# Patient Record
Sex: FEMALE | Race: WHITE | NOT HISPANIC OR LATINO | Employment: FULL TIME
[De-identification: names, ages, dates, MRNs, and addresses within clinical notes are randomized per-mention and may not be internally consistent; named-entity substitution may affect disease eponyms.]

---

## 2022-01-25 LAB
HEPATITIS B SURFACE ANTIGEN (EXTERNAL): NEGATIVE
HIV1+2 AB SERPL QL IA: NEGATIVE
RUBELLA ANTIBODY IGG (EXTERNAL): NORMAL
TREPONEMA PALLIDUM ANTIBODY (EXTERNAL): NONREACTIVE

## 2022-07-07 ENCOUNTER — HOSPITAL ENCOUNTER (OUTPATIENT)
Age: 32
End: 2022-07-07
Attending: OBSTETRICS & GYNECOLOGY | Admitting: OBSTETRICS & GYNECOLOGY
Payer: COMMERCIAL

## 2022-07-07 LAB — GROUP B STREPTOCOCCUS (EXTERNAL): NEGATIVE

## 2022-08-04 ENCOUNTER — HOSPITAL ENCOUNTER (INPATIENT)
Facility: CLINIC | Age: 32
LOS: 2 days | Discharge: HOME | End: 2022-08-06
Attending: OBSTETRICS & GYNECOLOGY | Admitting: OBSTETRICS & GYNECOLOGY
Payer: COMMERCIAL

## 2022-08-04 ENCOUNTER — ANESTHESIA EVENT (OUTPATIENT)
Dept: OBGYN | Facility: CLINIC | Age: 32
End: 2022-08-04
Payer: COMMERCIAL

## 2022-08-04 ENCOUNTER — ANESTHESIA (OUTPATIENT)
Dept: OBGYN | Facility: CLINIC | Age: 32
End: 2022-08-04
Payer: COMMERCIAL

## 2022-08-04 ENCOUNTER — APPOINTMENT (OUTPATIENT)
Dept: ULTRASOUND IMAGING | Facility: CLINIC | Age: 32
End: 2022-08-04
Attending: OBSTETRICS & GYNECOLOGY
Payer: COMMERCIAL

## 2022-08-04 DIAGNOSIS — Z36.89 ENCOUNTER FOR TRIAGE IN PREGNANT PATIENT: ICD-10-CM

## 2022-08-04 LAB
ABO/RH(D): NORMAL
ANTIBODY SCREEN: NEGATIVE
ERYTHROCYTE [DISTWIDTH] IN BLOOD BY AUTOMATED COUNT: 12.3 % (ref 10–15)
HCT VFR BLD AUTO: 38.9 % (ref 35–47)
HGB BLD-MCNC: 13.1 G/DL (ref 11.7–15.7)
MCH RBC QN AUTO: 30.4 PG (ref 26.5–33)
MCHC RBC AUTO-ENTMCNC: 33.7 G/DL (ref 31.5–36.5)
MCV RBC AUTO: 90 FL (ref 78–100)
PLATELET # BLD AUTO: 183 10E3/UL (ref 150–450)
RBC # BLD AUTO: 4.31 10E6/UL (ref 3.8–5.2)
SARS-COV-2 RNA RESP QL NAA+PROBE: NEGATIVE
SPECIMEN EXPIRATION DATE: NORMAL
T PALLIDUM AB SER QL: NONREACTIVE
WBC # BLD AUTO: 16.2 10E3/UL (ref 4–11)

## 2022-08-04 PROCEDURE — 250N000009 HC RX 250: Performed by: ANESTHESIOLOGY

## 2022-08-04 PROCEDURE — 00HU33Z INSERTION OF INFUSION DEVICE INTO SPINAL CANAL, PERCUTANEOUS APPROACH: ICD-10-PCS | Performed by: ANESTHESIOLOGY

## 2022-08-04 PROCEDURE — 250N000009 HC RX 250: Performed by: OBSTETRICS & GYNECOLOGY

## 2022-08-04 PROCEDURE — 250N000013 HC RX MED GY IP 250 OP 250 PS 637: Performed by: OBSTETRICS & GYNECOLOGY

## 2022-08-04 PROCEDURE — 120N000001 HC R&B MED SURG/OB

## 2022-08-04 PROCEDURE — 10907ZC DRAINAGE OF AMNIOTIC FLUID, THERAPEUTIC FROM PRODUCTS OF CONCEPTION, VIA NATURAL OR ARTIFICIAL OPENING: ICD-10-PCS | Performed by: OBSTETRICS & GYNECOLOGY

## 2022-08-04 PROCEDURE — 86901 BLOOD TYPING SEROLOGIC RH(D): CPT | Performed by: OBSTETRICS & GYNECOLOGY

## 2022-08-04 PROCEDURE — 722N000001 HC LABOR CARE VAGINAL DELIVERY SINGLE

## 2022-08-04 PROCEDURE — 3E0R3BZ INTRODUCTION OF ANESTHETIC AGENT INTO SPINAL CANAL, PERCUTANEOUS APPROACH: ICD-10-PCS | Performed by: ANESTHESIOLOGY

## 2022-08-04 PROCEDURE — 76819 FETAL BIOPHYS PROFIL W/O NST: CPT

## 2022-08-04 PROCEDURE — U0005 INFEC AGEN DETEC AMPLI PROBE: HCPCS | Performed by: OBSTETRICS & GYNECOLOGY

## 2022-08-04 PROCEDURE — 250N000011 HC RX IP 250 OP 636

## 2022-08-04 PROCEDURE — 250N000011 HC RX IP 250 OP 636: Performed by: ANESTHESIOLOGY

## 2022-08-04 PROCEDURE — 85027 COMPLETE CBC AUTOMATED: CPT | Performed by: OBSTETRICS & GYNECOLOGY

## 2022-08-04 PROCEDURE — 86850 RBC ANTIBODY SCREEN: CPT | Performed by: OBSTETRICS & GYNECOLOGY

## 2022-08-04 PROCEDURE — G0463 HOSPITAL OUTPT CLINIC VISIT: HCPCS

## 2022-08-04 PROCEDURE — 370N000003 HC ANESTHESIA WARD SERVICE

## 2022-08-04 PROCEDURE — 86780 TREPONEMA PALLIDUM: CPT | Performed by: OBSTETRICS & GYNECOLOGY

## 2022-08-04 PROCEDURE — 258N000003 HC RX IP 258 OP 636: Performed by: OBSTETRICS & GYNECOLOGY

## 2022-08-04 PROCEDURE — 0HQ9XZZ REPAIR PERINEUM SKIN, EXTERNAL APPROACH: ICD-10-PCS | Performed by: OBSTETRICS & GYNECOLOGY

## 2022-08-04 RX ORDER — NALOXONE HYDROCHLORIDE 0.4 MG/ML
0.4 INJECTION, SOLUTION INTRAMUSCULAR; INTRAVENOUS; SUBCUTANEOUS
Status: DISCONTINUED | OUTPATIENT
Start: 2022-08-04 | End: 2022-08-04 | Stop reason: HOSPADM

## 2022-08-04 RX ORDER — FENTANYL CITRATE 50 UG/ML
100 INJECTION, SOLUTION INTRAMUSCULAR; INTRAVENOUS
Status: DISCONTINUED | OUTPATIENT
Start: 2022-08-04 | End: 2022-08-04 | Stop reason: HOSPADM

## 2022-08-04 RX ORDER — PROCHLORPERAZINE MALEATE 10 MG
10 TABLET ORAL EVERY 6 HOURS PRN
Status: DISCONTINUED | OUTPATIENT
Start: 2022-08-04 | End: 2022-08-04 | Stop reason: HOSPADM

## 2022-08-04 RX ORDER — BISACODYL 10 MG
10 SUPPOSITORY, RECTAL RECTAL DAILY PRN
Status: DISCONTINUED | OUTPATIENT
Start: 2022-08-04 | End: 2022-08-06 | Stop reason: HOSPADM

## 2022-08-04 RX ORDER — SODIUM CHLORIDE, SODIUM LACTATE, POTASSIUM CHLORIDE, CALCIUM CHLORIDE 600; 310; 30; 20 MG/100ML; MG/100ML; MG/100ML; MG/100ML
INJECTION, SOLUTION INTRAVENOUS CONTINUOUS PRN
Status: DISCONTINUED | OUTPATIENT
Start: 2022-08-04 | End: 2022-08-04 | Stop reason: HOSPADM

## 2022-08-04 RX ORDER — IBUPROFEN 800 MG/1
800 TABLET, FILM COATED ORAL
Status: DISCONTINUED | OUTPATIENT
Start: 2022-08-04 | End: 2022-08-04

## 2022-08-04 RX ORDER — OXYTOCIN 10 [USP'U]/ML
10 INJECTION, SOLUTION INTRAMUSCULAR; INTRAVENOUS
Status: DISCONTINUED | OUTPATIENT
Start: 2022-08-04 | End: 2022-08-04 | Stop reason: HOSPADM

## 2022-08-04 RX ORDER — CARBOPROST TROMETHAMINE 250 UG/ML
250 INJECTION, SOLUTION INTRAMUSCULAR
Status: DISCONTINUED | OUTPATIENT
Start: 2022-08-04 | End: 2022-08-06 | Stop reason: HOSPADM

## 2022-08-04 RX ORDER — MISOPROSTOL 200 UG/1
800 TABLET ORAL
Status: DISCONTINUED | OUTPATIENT
Start: 2022-08-04 | End: 2022-08-06 | Stop reason: HOSPADM

## 2022-08-04 RX ORDER — MISOPROSTOL 200 UG/1
800 TABLET ORAL
Status: DISCONTINUED | OUTPATIENT
Start: 2022-08-04 | End: 2022-08-04 | Stop reason: HOSPADM

## 2022-08-04 RX ORDER — MISOPROSTOL 200 UG/1
400 TABLET ORAL
Status: DISCONTINUED | OUTPATIENT
Start: 2022-08-04 | End: 2022-08-06 | Stop reason: HOSPADM

## 2022-08-04 RX ORDER — OXYTOCIN/0.9 % SODIUM CHLORIDE 30/500 ML
340 PLASTIC BAG, INJECTION (ML) INTRAVENOUS CONTINUOUS PRN
Status: DISCONTINUED | OUTPATIENT
Start: 2022-08-04 | End: 2022-08-06 | Stop reason: HOSPADM

## 2022-08-04 RX ORDER — KETOROLAC TROMETHAMINE 30 MG/ML
30 INJECTION, SOLUTION INTRAMUSCULAR; INTRAVENOUS
Status: DISCONTINUED | OUTPATIENT
Start: 2022-08-04 | End: 2022-08-04

## 2022-08-04 RX ORDER — METHYLERGONOVINE MALEATE 0.2 MG/ML
200 INJECTION INTRAVENOUS
Status: DISCONTINUED | OUTPATIENT
Start: 2022-08-04 | End: 2022-08-06 | Stop reason: HOSPADM

## 2022-08-04 RX ORDER — NALBUPHINE HYDROCHLORIDE 10 MG/ML
2.5-5 INJECTION, SOLUTION INTRAMUSCULAR; INTRAVENOUS; SUBCUTANEOUS EVERY 6 HOURS PRN
Status: DISCONTINUED | OUTPATIENT
Start: 2022-08-04 | End: 2022-08-04

## 2022-08-04 RX ORDER — PRENATAL VIT/IRON FUM/FOLIC AC 27MG-0.8MG
1 TABLET ORAL DAILY
COMMUNITY

## 2022-08-04 RX ORDER — MISOPROSTOL 200 UG/1
400 TABLET ORAL
Status: DISCONTINUED | OUTPATIENT
Start: 2022-08-04 | End: 2022-08-04 | Stop reason: HOSPADM

## 2022-08-04 RX ORDER — HYDROCORTISONE 25 MG/G
CREAM TOPICAL 3 TIMES DAILY PRN
Status: DISCONTINUED | OUTPATIENT
Start: 2022-08-04 | End: 2022-08-06 | Stop reason: HOSPADM

## 2022-08-04 RX ORDER — SODIUM CHLORIDE, SODIUM LACTATE, POTASSIUM CHLORIDE, CALCIUM CHLORIDE 600; 310; 30; 20 MG/100ML; MG/100ML; MG/100ML; MG/100ML
INJECTION, SOLUTION INTRAVENOUS CONTINUOUS
Status: DISCONTINUED | OUTPATIENT
Start: 2022-08-04 | End: 2022-08-04 | Stop reason: HOSPADM

## 2022-08-04 RX ORDER — OXYTOCIN 10 [USP'U]/ML
10 INJECTION, SOLUTION INTRAMUSCULAR; INTRAVENOUS
Status: DISCONTINUED | OUTPATIENT
Start: 2022-08-04 | End: 2022-08-06 | Stop reason: HOSPADM

## 2022-08-04 RX ORDER — METOCLOPRAMIDE 10 MG/1
10 TABLET ORAL EVERY 6 HOURS PRN
Status: DISCONTINUED | OUTPATIENT
Start: 2022-08-04 | End: 2022-08-04 | Stop reason: HOSPADM

## 2022-08-04 RX ORDER — OXYTOCIN 10 [USP'U]/ML
10 INJECTION, SOLUTION INTRAMUSCULAR; INTRAVENOUS
Status: DISCONTINUED | OUTPATIENT
Start: 2022-08-04 | End: 2022-08-04

## 2022-08-04 RX ORDER — MODIFIED LANOLIN
OINTMENT (GRAM) TOPICAL
Status: DISCONTINUED | OUTPATIENT
Start: 2022-08-04 | End: 2022-08-06 | Stop reason: HOSPADM

## 2022-08-04 RX ORDER — LIDOCAINE 40 MG/G
CREAM TOPICAL
Status: DISCONTINUED | OUTPATIENT
Start: 2022-08-04 | End: 2022-08-04

## 2022-08-04 RX ORDER — ASPIRIN 81 MG/1
81 TABLET, CHEWABLE ORAL DAILY
Status: ON HOLD | COMMUNITY
End: 2022-08-05

## 2022-08-04 RX ORDER — HYDROXYZINE HYDROCHLORIDE 50 MG/1
100 TABLET, FILM COATED ORAL ONCE
Status: DISCONTINUED | OUTPATIENT
Start: 2022-08-04 | End: 2022-08-04

## 2022-08-04 RX ORDER — LIDOCAINE HYDROCHLORIDE AND EPINEPHRINE 15; 5 MG/ML; UG/ML
INJECTION, SOLUTION EPIDURAL PRN
Status: DISCONTINUED | OUTPATIENT
Start: 2022-08-04 | End: 2022-08-04

## 2022-08-04 RX ORDER — OXYTOCIN/0.9 % SODIUM CHLORIDE 30/500 ML
340 PLASTIC BAG, INJECTION (ML) INTRAVENOUS CONTINUOUS PRN
Status: COMPLETED | OUTPATIENT
Start: 2022-08-04 | End: 2022-08-04

## 2022-08-04 RX ORDER — EPHEDRINE SULFATE 50 MG/ML
5 INJECTION, SOLUTION INTRAMUSCULAR; INTRAVENOUS; SUBCUTANEOUS
Status: DISCONTINUED | OUTPATIENT
Start: 2022-08-04 | End: 2022-08-04 | Stop reason: HOSPADM

## 2022-08-04 RX ORDER — METOCLOPRAMIDE HYDROCHLORIDE 5 MG/ML
10 INJECTION INTRAMUSCULAR; INTRAVENOUS EVERY 6 HOURS PRN
Status: DISCONTINUED | OUTPATIENT
Start: 2022-08-04 | End: 2022-08-04 | Stop reason: HOSPADM

## 2022-08-04 RX ORDER — OXYTOCIN/0.9 % SODIUM CHLORIDE 30/500 ML
100-340 PLASTIC BAG, INJECTION (ML) INTRAVENOUS CONTINUOUS PRN
Status: DISCONTINUED | OUTPATIENT
Start: 2022-08-04 | End: 2022-08-04

## 2022-08-04 RX ORDER — NALOXONE HYDROCHLORIDE 0.4 MG/ML
0.2 INJECTION, SOLUTION INTRAMUSCULAR; INTRAVENOUS; SUBCUTANEOUS
Status: DISCONTINUED | OUTPATIENT
Start: 2022-08-04 | End: 2022-08-04 | Stop reason: HOSPADM

## 2022-08-04 RX ORDER — ONDANSETRON 4 MG/1
4 TABLET, ORALLY DISINTEGRATING ORAL EVERY 6 HOURS PRN
Status: DISCONTINUED | OUTPATIENT
Start: 2022-08-04 | End: 2022-08-04 | Stop reason: HOSPADM

## 2022-08-04 RX ORDER — FAMOTIDINE 20 MG/1
20 TABLET, FILM COATED ORAL AT BEDTIME
Status: DISCONTINUED | OUTPATIENT
Start: 2022-08-04 | End: 2022-08-04

## 2022-08-04 RX ORDER — OXYTOCIN/0.9 % SODIUM CHLORIDE 30/500 ML
1-24 PLASTIC BAG, INJECTION (ML) INTRAVENOUS CONTINUOUS
Status: DISCONTINUED | OUTPATIENT
Start: 2022-08-04 | End: 2022-08-04 | Stop reason: HOSPADM

## 2022-08-04 RX ORDER — BUPIVACAINE HYDROCHLORIDE 2.5 MG/ML
15 INJECTION, SOLUTION EPIDURAL; INFILTRATION; INTRACAUDAL ONCE
Status: COMPLETED | OUTPATIENT
Start: 2022-08-04 | End: 2022-08-04

## 2022-08-04 RX ORDER — PROCHLORPERAZINE 25 MG
25 SUPPOSITORY, RECTAL RECTAL EVERY 12 HOURS PRN
Status: DISCONTINUED | OUTPATIENT
Start: 2022-08-04 | End: 2022-08-04 | Stop reason: HOSPADM

## 2022-08-04 RX ORDER — TRANEXAMIC ACID 10 MG/ML
1 INJECTION, SOLUTION INTRAVENOUS EVERY 30 MIN PRN
Status: DISCONTINUED | OUTPATIENT
Start: 2022-08-04 | End: 2022-08-04 | Stop reason: HOSPADM

## 2022-08-04 RX ORDER — CITRIC ACID/SODIUM CITRATE 334-500MG
30 SOLUTION, ORAL ORAL
Status: DISCONTINUED | OUTPATIENT
Start: 2022-08-04 | End: 2022-08-04 | Stop reason: HOSPADM

## 2022-08-04 RX ORDER — LIDOCAINE 40 MG/G
CREAM TOPICAL
Status: DISCONTINUED | OUTPATIENT
Start: 2022-08-04 | End: 2022-08-04 | Stop reason: HOSPADM

## 2022-08-04 RX ORDER — TRANEXAMIC ACID 10 MG/ML
1 INJECTION, SOLUTION INTRAVENOUS EVERY 30 MIN PRN
Status: DISCONTINUED | OUTPATIENT
Start: 2022-08-04 | End: 2022-08-06 | Stop reason: HOSPADM

## 2022-08-04 RX ORDER — CARBOPROST TROMETHAMINE 250 UG/ML
250 INJECTION, SOLUTION INTRAMUSCULAR
Status: DISCONTINUED | OUTPATIENT
Start: 2022-08-04 | End: 2022-08-04 | Stop reason: HOSPADM

## 2022-08-04 RX ORDER — FENTANYL/BUPIVACAINE/NS/PF 2-1250MCG
PLASTIC BAG, INJECTION (ML) INJECTION
Status: COMPLETED
Start: 2022-08-04 | End: 2022-08-04

## 2022-08-04 RX ORDER — ONDANSETRON 2 MG/ML
4 INJECTION INTRAMUSCULAR; INTRAVENOUS EVERY 6 HOURS PRN
Status: DISCONTINUED | OUTPATIENT
Start: 2022-08-04 | End: 2022-08-04 | Stop reason: HOSPADM

## 2022-08-04 RX ORDER — DOCUSATE SODIUM 100 MG/1
100 CAPSULE, LIQUID FILLED ORAL DAILY
Status: DISCONTINUED | OUTPATIENT
Start: 2022-08-04 | End: 2022-08-06 | Stop reason: HOSPADM

## 2022-08-04 RX ORDER — ACETAMINOPHEN 325 MG/1
650 TABLET ORAL EVERY 4 HOURS PRN
Status: DISCONTINUED | OUTPATIENT
Start: 2022-08-04 | End: 2022-08-06 | Stop reason: HOSPADM

## 2022-08-04 RX ORDER — METHYLERGONOVINE MALEATE 0.2 MG/ML
200 INJECTION INTRAVENOUS
Status: DISCONTINUED | OUTPATIENT
Start: 2022-08-04 | End: 2022-08-04 | Stop reason: HOSPADM

## 2022-08-04 RX ORDER — IBUPROFEN 800 MG/1
800 TABLET, FILM COATED ORAL EVERY 6 HOURS PRN
Status: DISCONTINUED | OUTPATIENT
Start: 2022-08-04 | End: 2022-08-06 | Stop reason: HOSPADM

## 2022-08-04 RX ORDER — PRENATAL VIT/IRON FUM/FOLIC AC 27MG-0.8MG
1 TABLET ORAL DAILY
Status: DISCONTINUED | OUTPATIENT
Start: 2022-08-04 | End: 2022-08-06 | Stop reason: HOSPADM

## 2022-08-04 RX ADMIN — EPHEDRINE SULFATE 5 MG: 50 INJECTION INTRAVENOUS at 10:37

## 2022-08-04 RX ADMIN — IBUPROFEN 800 MG: 800 TABLET, FILM COATED ORAL at 21:01

## 2022-08-04 RX ADMIN — Medication 340 ML/HR: at 13:04

## 2022-08-04 RX ADMIN — PRENATAL VITAMINS-IRON FUMARATE 27 MG IRON-FOLIC ACID 0.8 MG TABLET 1 TABLET: at 16:14

## 2022-08-04 RX ADMIN — IBUPROFEN 800 MG: 800 TABLET, FILM COATED ORAL at 14:21

## 2022-08-04 RX ADMIN — BUPIVACAINE HYDROCHLORIDE 5 ML: 2.5 INJECTION, SOLUTION EPIDURAL; INFILTRATION; INTRACAUDAL at 08:23

## 2022-08-04 RX ADMIN — BUPIVACAINE HYDROCHLORIDE 5 ML: 2.5 INJECTION, SOLUTION EPIDURAL; INFILTRATION; INTRACAUDAL at 08:25

## 2022-08-04 RX ADMIN — LIDOCAINE HYDROCHLORIDE,EPINEPHRINE BITARTRATE 3 ML: 15; .005 INJECTION, SOLUTION EPIDURAL; INFILTRATION; INTRACAUDAL; PERINEURAL at 08:21

## 2022-08-04 RX ADMIN — Medication 2 MILLI-UNITS/MIN: at 10:45

## 2022-08-04 RX ADMIN — Medication: at 08:34

## 2022-08-04 RX ADMIN — SODIUM CHLORIDE, POTASSIUM CHLORIDE, SODIUM LACTATE AND CALCIUM CHLORIDE 500 ML: 600; 310; 30; 20 INJECTION, SOLUTION INTRAVENOUS at 11:46

## 2022-08-04 RX ADMIN — DOCUSATE SODIUM 100 MG: 100 CAPSULE, LIQUID FILLED ORAL at 16:14

## 2022-08-04 ASSESSMENT — ACTIVITIES OF DAILY LIVING (ADL)
ADLS_ACUITY_SCORE: 20

## 2022-08-04 NOTE — PROGRESS NOTES
"[facility]   OB PROGRESS NOTE     S. Pt states she is doing well overall. Comfortable with epidural . +FM    BP 98/56   Pulse 57   Temp 98.1  F (36.7  C) (Oral)   Resp 18   Ht 1.772 m (5' 9.75\")   Wt 93 kg (205 lb)   BMI 29.63 kg/m      Fetal Heart Tones: 125 baseline, moderate variablility, + accels, no decels and Category I  TOCO: frequency q 4-8 minutes  Cervical Exam: 4.5/ 90/ Anterior/ soft/ -1    A/P Mariya Santiago is a 31 year old  at 40w5d here with labor    1- labor  - AROM with scant fluid   - will start pitocin   - FHT Cat I  - continue monitoring    Dr. Heather Aguilera DO       "

## 2022-08-04 NOTE — PROVIDER NOTIFICATION
08/04/22 0550   Provider Notification   Provider Name/Title Dr. Brower   Method of Notification Electronic Page   Request Evaluate - Remote   Notification Reason Status Update     Notified Dr. Brower of late deceleration on FHR tracing and increase in pt's contraction pain after he had given the order for pt to discharge to home given unchanged SVE.  MD reviewed tracing. Orders to admit patient, intrapartum orders received and placed.  Report given to oncoming RN who assumed care of patient.

## 2022-08-04 NOTE — PLAN OF CARE
Dr Aguilera updated on SVE 3.5/90/-1 station, ctx pattern, and patients tolerance to labor pain. Pt is rating her pain at 4-5 out of 10, is coping with labor but, at some point would like an epidural.

## 2022-08-04 NOTE — H&P
[facility]     History and Physical  Obstetrics and Gynecology     Date of Admission:  2022    History of Present Illness   Mariya Santiago is a 31 year old female  40w5d with Estimated Date of Delivery: 2022 who presents with early labor. Ctx have been increasing past 24 hours. Denies LOF, VB, decreased FM.    PRENATAL COURSE  Prenatal care was received at [facility] and the  course was complicated by COVID 1st TriM, Constipation, Overweight, Round Ligament Pain, GERD.     No lab results found.  Rhogam not indicated   Recent Labs   Lab Test 22  0425   GBS Negative       Past Medical History    I have reviewed this patient's medical history and updated it with pertinent information if needed.   Past Medical History:   Diagnosis Date     Kidney infection        Past Surgical History   I have reviewed this patient's surgical history and updated it with pertinent information if needed.  Past Surgical History:   Procedure Laterality Date     WISDOM TOOTH EXTRACTION         Prior to Admission Medications   Prior to Admission Medications   Prescriptions Last Dose Informant Patient Reported? Taking?   Prenatal Vit-Fe Fumarate-FA (PRENATAL MULTIVITAMIN W/IRON) 27-0.8 MG tablet 22  Yes Yes   Sig: Take 1 tablet by mouth daily   aspirin (ASA) 81 MG chewable tablet 22 at Unknown time  Yes Yes   Sig: Take 81 mg by mouth daily      Facility-Administered Medications: None     Allergies   Allergies   Allergen Reactions     Diazepam Hives     Sulfamethoxazole-Trimethoprim Hives and Rash       Social History   I have reviewed this patient's social history and updated it with pertinent information if needed. Mariya Santiago  reports that she has never smoked. She has never used smokeless tobacco. She reports previous alcohol use. She reports that she does not use drugs.    Family History   I have reviewed this patient's family  history and updated it with pertinent information if needed.   History reviewed. No pertinent family history.    Immunization History   Reviewed    Physical Exam   Temp: 98.8  F (37.1  C) Temp src: Oral BP: 127/71     Resp: 16        Vital Signs with Ranges  Temp:  [98.8  F (37.1  C)] 98.8  F (37.1  C)  Resp:  [16] 16  BP: (127)/(71) 127/71  Fetal Heart Tones: 130 baseline, moderate variablility, + accels, + decels (occassional late decelerations w/o significant bradycardia); Category II reactive and reassuring.  TOCO: frequency q 4 minutes    Constitutional: healthy, alert and active   Respiratory: Non-labored  Cardiovascular: RR  Abdomen: gravid, single vertex fetus, non-tender, EFW 8 lbs  Cervical Exam: 3/ 80/ Anterior/ firm/ -2   Skin/Extremites: no redness, warmth, or swelling  Neurologic: A&O  Neuropsychiatric: General: normal, calm and normal eye contact    Prenatal Care:  -OB labs reviewed: A+, Ab screen neg, HepB Surface Ag neg, Hep C Ab neg, HIV neg, Rubella immune, Trep NR  -Genetics: NIPS low risk, XX, AFP neg  -Anatomy ultrasound: LVL 2 MFM US, normal growth  -GCT (103), CBC (11.9), RPR (NR)  -S/p Tdap 22   -S/p Flu vaccine   -COVID vaccine Carmenza 3/2021; Moderna   -GBS at 36 weeks: neg.  -Feeding: breastfeeding, pump Rx sent, picked up.  -BC: Jg    22 BPP: cephalic, , placenta fundal    Assessment & Plan   Mariya Santiago is a 31 year old female  who presents at 40w5d with early labor, BPP 10/10 but Cat II FHT's with decels noted. Admit for augmentation.   GBS neg.   NST reactive.  Category  II, though reactive and reassuring     Admit - see IP orders  - Pitocin  - s/p Epidural    - CBC, T&S, Syph, COVID  - Home Pepcid 20mg at bedtime  - Cancel Saturday IOL    Heather Aguilera DO

## 2022-08-04 NOTE — PROVIDER NOTIFICATION
08/04/22 0340   Provider Notification   Provider Name/Title Dr. Brower   Method of Notification Electronic Page   Request Evaluate - Remote   Notification Reason SVE     Updated MD regarding SVE recheck of 3/80/-2, which is essentially unchanged from first exam.  FHT difficult to determine if baby is having accels or decels.  [address] [place] and US adjusted with moms position changes to help determine FHT with contractions. Orders received for BPP, recheck SVE when back from BPP if  patient is still having contractions. Update MD at that time.

## 2022-08-04 NOTE — PROVIDER NOTIFICATION
22 1300   Comments   Comments  of female infant. Baby placed skin to skin, delayed cord clamping completed. APGAR 9&9

## 2022-08-04 NOTE — PLAN OF CARE
Data: Patient presented to [facility]: 2022  1:37 AM.  Reason for maternal/fetal assessment is uterine contractions. Patient reports she had her membranes swept on Tuesday during her clinic appt and has been having contractions since then. Initially, pt reports them being very irregular, but then coming every 10 minutes and getting closer together until she decided to come in when they were every 4-6 minutes apart.  Pt rates the contraction pain 4/10 and describes it as cramping in her lower abdomen with constant back pain.  Patient is a .  Prenatal record reviewed. Pregnancy has been uncomplicated..  Gestational Age 40w5d. VSS. Fetal movement active. Patient denies leaking of vaginal fluid/rupture of membranes, vaginal bleeding, nausea, vomiting, headache, visual disturbances, epigastric or URQ pain, significant edema. Support person is present.   Action: Verbal consent for EFM. Triage assessment completed. Bill of rights reviewed.  Response: Patient verbalized agreement with plan. Will contact Dr Lui Brower with update and for further orders.

## 2022-08-04 NOTE — PROVIDER NOTIFICATION
08/04/22 1145   Comments   Comments Recurrent variable and intermittent PD. repositioned on left side. SVE 6-7/100/0 station. Pitocin discontinued and LR bolus started. Repositioned on left side. Dr Aguilera updated.

## 2022-08-04 NOTE — ANESTHESIA PROCEDURE NOTES
Epidural catheter Procedure Note    Pre-Procedure   Staff -        Anesthesiologist:  Jonel Herrera MD       Performed By: anesthesiologist       Referred By: Hazel       Location: OB       Pre-Anesthestic Checklist: patient identified, IV checked, risks and benefits discussed, informed consent, monitors and equipment checked, pre-op evaluation, at physician/surgeon's request and post-op pain management  Timeout:       Correct Patient: Yes        Correct Procedure: Yes        Correct Site: Yes        Correct Position: Yes   Procedure Documentation  Procedure: epidural catheter   Comments:  Patient desires Labor Epidural for labor analgesia. Vaginal delivery anticipated.    Chart reviewed. Patient examined. No changes to pre procedure chart review. Risks including but not limited to bleeding, infection, nerve injury, PDPH, intrathecal injection, high block, incomplete block, one-sided block, back pain, and low blood pressure discussed in detail. Questions answered. Consent signed.    Pause for the Cause completed. NIBP and pulse ox functioning. L&D nurse present.    Procedure: Sitting. Betadine prep x 3. Sterile drape applied.  Lidocaine 1% x 2 cc local infiltration at L 3-4.  17 G. Tu needle ML ALISIA 1 attempt.  No CSF, paresthesia or blood. 20 g. Epidural catheter inserted w/o resistance 5 cm.  Negative aspiration for CSF and blood. Filter in line.  Test dose Lidocaine 1.5% w/ 1:200,000 epi x 3 cc injected. Negative for neuro change or symptoms of intravascular injection.  Bolus dose: Marcaine 0.25% 5cc x 2 doses (10 cc total).  Infusion orders written.    I or my partner am immediately available. I or my partner will monitor the patient and supervise nursing care at necessary intervals.    JAKollitzMD

## 2022-08-04 NOTE — L&D DELIVERY NOTE
Mariya Santiago is a 31 year old-year-old  female,  1 para 0 with EDC 22, who was admitted for labor at 40 weeks gestation.    Her prenatal care was at the [facility] in [place]. Prenatal course was complicated by COVID 1st TriM, Constipation, Overweight, Round Ligament Pain, GERD. Vaginal Group B Streptococcus culture was negative.  Patient underwent artificial rupture of membranes at 1025, yielding scant clear fluid.     Oxytocin augmentation was initiated per standard protocol for absent/inadequate labor.  Patient received an epidural for pain relief.  The patient achieved complete dilation at 1230. She went on to deliver a 7 #, 11.5 oz female infant at 1300 by . Apgars were  9 at one minute and 9 at five minutes. The fetal oropharynx was bulb suctioned on the perineum.  There was no nuchal cord. The placenta delivered spontaneously and intact at 1304.  The patient had bilateral labial lacerations. This was repaired with #4-0 vicryl.  EBL for the delivery was 27.    Dr. Heather Aguilera DO

## 2022-08-04 NOTE — PROVIDER NOTIFICATION
08/04/22 1245   Comments   Comments EFM tracing reviewed. SVE 10/100/+2 will begin pushing with ctx

## 2022-08-04 NOTE — PROVIDER NOTIFICATION
08/04/22 0510   Provider Notification   Provider Name/Title Dr. Brower   Method of Notification Electronic Page   Request Evaluate - Remote   Notification Reason SVE;Status Update       Updated MD of BPP results of 8/8. Reactive NST since pt has been on continuous monitoring when back from US.  SVE 3/80/-2, unchanged from previous exam.  Orders for patient to discharge home. Okay to give patient hydroxyzine 100mg prior to discharge.

## 2022-08-04 NOTE — PROVIDER NOTIFICATION
08/04/22 1025   Provider Notification   Provider Name/Title Dr Aguilera   Method of Notification At Bedside   Comments   Comments EFM tracing reviewed. SVE 4-5/90/-1 station. AROM scant clear, bloody show present. Discussed ctx pattern with pt and the need for more consistent ctx. Discussed Pitocin augmentation. Pt is verbalizing her agreement with plan. Will begin Pitocin augmentation.

## 2022-08-04 NOTE — PROVIDER NOTIFICATION
08/04/22 0227   Provider Notification   Provider Name/Title Dr. Brower   Method of Notification Phone   Request Evaluate - Remote   Notification Reason Patient Arrived;SVE     Dr. Lui Brower informed of patient arrival and assessment including the following:    Reason for maternal/fetal assessment uterine contractions. Pt reports she had her membranes swept on Tuesday during her clinic appt and has been having contractions since then. Initially, pt reports them being very irregular, but then coming every 10 minutes and getting closer together until she decided to come in when they were every 4-6 minutes apart.  Pt rates the contraction pain 4/10 and describes it as cramping in her lower abdomen with constant back pain.  SVE 2.5/80/-2.  Fetal status active, minimal variability, accels present,  possibly a few late decelerations.   Plan per provider/orders received for continued fetal and uterine monitoring due to possible decelerations, and recheck SVE in an hour to see if patient has made cervical change.

## 2022-08-04 NOTE — ANESTHESIA PREPROCEDURE EVALUATION
Anesthesia Pre-Procedure Evaluation    Patient: Mariya Sanitago   MRN: [id number] : 1990        Procedure :           Past Medical History:   Diagnosis Date     Kidney infection 2019      Past Surgical History:   Procedure Laterality Date     WISDOM TOOTH EXTRACTION        Allergies   Allergen Reactions     Diazepam Hives     Sulfamethoxazole-Trimethoprim Hives and Rash      Social History     Tobacco Use     Smoking status: Never Smoker     Smokeless tobacco: Never Used   Substance Use Topics     Alcohol use: Not Currently      Wt Readings from Last 1 Encounters:   22 93 kg (205 lb)        Anesthesia Evaluation   Pt has had prior anesthetic. Type: General.    No history of anesthetic complications       ROS/MED HX  ENT/Pulmonary:  - neg pulmonary ROS     Neurologic:  - neg neurologic ROS     Cardiovascular:  - neg cardiovascular ROS     METS/Exercise Tolerance:     Hematologic:  - neg hematologic  ROS     Musculoskeletal:  - neg musculoskeletal ROS     GI/Hepatic:     (+) GERD,     Renal/Genitourinary:  - neg Renal ROS     Endo:  - neg endo ROS     Psychiatric/Substance Use:  - neg psychiatric ROS     Infectious Disease:  - neg infectious disease ROS     Malignancy:  - neg malignancy ROS     Other:      (+) Possibly pregnant, ,  (-) previous  and TOLAC candidate       Physical Exam    Airway        Mallampati: II   TM distance: > 3 FB   Neck ROM: full   Mouth opening: > 3 cm    Respiratory Devices and Support         Dental  no notable dental history         Cardiovascular   cardiovascular exam normal          Pulmonary   pulmonary exam normal            Other findings: Lab Test        22                       0634          WBC          16.2*         HGB          13.1          MCV          90            PLT          183            No lab results found.            OUTSIDE LABS:  CBC:   Lab Results   Component Value Date    WBC 16.2 (H) 2022    HGB 13.1 2022    HCT 38.9  08/04/2022     08/04/2022     BMP: No results found for: NA, POTASSIUM, CHLORIDE, CO2, BUN, CR, GLC  COAGS: No results found for: PTT, INR, FIBR  POC: No results found for: BGM, HCG, HCGS  HEPATIC: No results found for: ALBUMIN, PROTTOTAL, ALT, AST, GGT, ALKPHOS, BILITOTAL, BILIDIRECT, EMELY  OTHER: No results found for: PH, LACT, A1C, LINNETTE, PHOS, MAG, LIPASE, AMYLASE, TSH, T4, T3, CRP, SED    Anesthesia Plan    ASA Status:  2      Anesthesia Type: Epidural.              Consents    Anesthesia Plan(s) and associated risks, benefits, and realistic alternatives discussed. Questions answered and patient/representative(s) expressed understanding.    - Discussed:     - Discussed with:  Patient         Postoperative Care            Comments:           neg OB ROS.       Thuan Pang MD

## 2022-08-05 LAB — HGB BLD-MCNC: 11.1 G/DL (ref 11.7–15.7)

## 2022-08-05 PROCEDURE — 36415 COLL VENOUS BLD VENIPUNCTURE: CPT | Performed by: OBSTETRICS & GYNECOLOGY

## 2022-08-05 PROCEDURE — 250N000013 HC RX MED GY IP 250 OP 250 PS 637: Performed by: OBSTETRICS & GYNECOLOGY

## 2022-08-05 PROCEDURE — 120N000001 HC R&B MED SURG/OB

## 2022-08-05 PROCEDURE — 85018 HEMOGLOBIN: CPT | Performed by: OBSTETRICS & GYNECOLOGY

## 2022-08-05 RX ORDER — HYDROCORTISONE 25 MG/G
CREAM TOPICAL 3 TIMES DAILY PRN
Qty: 30 G | Refills: 0 | Status: SHIPPED | OUTPATIENT
Start: 2022-08-05

## 2022-08-05 RX ORDER — DOCUSATE SODIUM 100 MG/1
100 CAPSULE, LIQUID FILLED ORAL DAILY
Qty: 30 CAPSULE | Refills: 0 | Status: SHIPPED | OUTPATIENT
Start: 2022-08-06

## 2022-08-05 RX ORDER — IBUPROFEN 800 MG/1
800 TABLET, FILM COATED ORAL EVERY 6 HOURS PRN
Qty: 40 TABLET | Refills: 1 | Status: SHIPPED | OUTPATIENT
Start: 2022-08-05

## 2022-08-05 RX ORDER — MODIFIED LANOLIN
OINTMENT (GRAM) TOPICAL
Qty: 7 G | Refills: 3 | Status: SHIPPED | OUTPATIENT
Start: 2022-08-05

## 2022-08-05 RX ADMIN — PRENATAL VITAMINS-IRON FUMARATE 27 MG IRON-FOLIC ACID 0.8 MG TABLET 1 TABLET: at 09:15

## 2022-08-05 RX ADMIN — IBUPROFEN 800 MG: 800 TABLET, FILM COATED ORAL at 21:56

## 2022-08-05 RX ADMIN — IBUPROFEN 800 MG: 800 TABLET, FILM COATED ORAL at 09:15

## 2022-08-05 RX ADMIN — IBUPROFEN 800 MG: 800 TABLET, FILM COATED ORAL at 15:30

## 2022-08-05 RX ADMIN — DOCUSATE SODIUM 100 MG: 100 CAPSULE, LIQUID FILLED ORAL at 09:15

## 2022-08-05 RX ADMIN — IBUPROFEN 800 MG: 800 TABLET, FILM COATED ORAL at 03:06

## 2022-08-05 ASSESSMENT — ACTIVITIES OF DAILY LIVING (ADL)
ADLS_ACUITY_SCORE: 20

## 2022-08-05 NOTE — PLAN OF CARE
VSS. Pain controlled. Voiding well. Breastfeeding education and assistance given. Using nipple shield for flat nipples. Has small red blister to R nipple post feed. Using mothers love for comfort. Fundus and bleeding WNL. Significant other at bedside and attentive to patient and infant needs. Will continue to monitor and follow plan of care.    Macey Miller RN on 8/4/2022 at 8:12 PM

## 2022-08-05 NOTE — DISCHARGE SUMMARY
[facility] Discharge Summary    Mariya Santiago MRN# [id number]   Age: 31 year old YOB: 1990     Date of Admission:  2022  Date of Discharge::  2022  Admitting Physician:  Lui Brower MD  Discharge Physician:  Marily Brumfield MD     Home clinic: [facility]          Admission Diagnoses:   Intrauterine pregnancy at 40w5  Spontaneous labor          Discharge Diagnosis:   Normal spontaneous vaginal delivery  Intrauterine pregnancy at 40 weeks gestation          Procedures:   Procedure(s):        No other procedures performed during this admission           Medications Prior to Admission:     Medications Prior to Admission   Medication Sig Dispense Refill Last Dose     Prenatal Vit-Fe Fumarate-FA (PRENATAL MULTIVITAMIN W/IRON) 27-0.8 MG tablet Take 1 tablet by mouth daily   22     [DISCONTINUED] aspirin (ASA) 81 MG chewable tablet Take 81 mg by mouth daily   22 at Unknown time             Discharge Medications:     Current Discharge Medication List      START taking these medications    Details   benzocaine (AMERICAINE) 20 % external aerosol Apply to perineum four times daily as needed for pain  Qty: 57 g, Refills: 1    Associated Diagnoses: Indication for care in labor or delivery      docusate sodium (COLACE) 100 MG capsule Take 1 capsule (100 mg) by mouth daily  Qty: 30 capsule, Refills: 0    Associated Diagnoses: Indication for care in labor or delivery      hydrocortisone, Perianal, (ANUSOL-HC) 2.5 % cream Place rectally 3 times daily as needed for hemorrhoids  Qty: 30 g, Refills: 0    Associated Diagnoses: Indication for care in labor or delivery      ibuprofen (ADVIL/MOTRIN) 800 MG tablet Take 1 tablet (800 mg) by mouth every 6 hours as needed for other (cramping)  Qty: 40 tablet, Refills: 1    Associated Diagnoses: Indication for care in labor or delivery      lanolin ointment Apply topically every hour as needed for other (sore  nipples)  Qty: 7 g, Refills: 3    Associated Diagnoses: Indication for care in labor or delivery         CONTINUE these medications which have NOT CHANGED    Details   Prenatal Vit-Fe Fumarate-FA (PRENATAL MULTIVITAMIN W/IRON) 27-0.8 MG tablet Take 1 tablet by mouth daily         STOP taking these medications       aspirin (ASA) 81 MG chewable tablet Comments:   Reason for Stopping:                     Consultations:   No consultations were requested during this admission          Brief History of Labor:   Mariya Santiago is a 31 year old-year-old  female,  1 para 0 with EDC 22, who was admitted for labor at 40 weeks gestation.    Her prenatal care was at the [facility] in [place]. Prenatal course was complicated by COVID 1st TriM, Constipation, Overweight, Round Ligament Pain, GERD. Vaginal Group B Streptococcus culture was negative.  Patient underwent artificial rupture of membranes at 1025, yielding scant clear fluid.     Oxytocin augmentation was initiated per standard protocol for absent/inadequate labor.  Patient received an epidural for pain relief.  The patient achieved complete dilation at 1230. She went on to deliver a 7 #, 11.5 oz female infant at 1300 by . Apgars were  9 at one minute and 9 at five minutes. The fetal oropharynx was bulb suctioned on the perineum.  There was no nuchal cord. The placenta delivered spontaneously and intact at 1304.  The patient had bilateral labial lacerations. This was repaired with #4-0 vicryl.  EBL for the delivery was 27.           Hospital Course:   The patient's hospital course was unremarkable.  On discharge, her pain was well controlled. Vaginal bleeding is similar to peak menstrual flow.  Voiding without difficulty.  Ambulating well and tolerating a normal diet.  No fever.  Breastfeeding well.  Infant is stable.  No bowel movement yet.*  She was discharged on post-partum day #1.    ABLA: PO iron started    Post-partum  hemoglobin:   Hemoglobin   Date Value Ref Range Status   08/05/2022 11.1 (L) 11.7 - 15.7 g/dL Final             Discharge Instructions and Follow-Up:   Discharge diet: Regular   Discharge activity: No driving or operating machinery while on narcotic analgesics  Pelvic rest: abstain from intercourse and do not use tampons for 6 week(s)   Discharge follow-up: Follow up with primary care provider in 6 weeks   Wound care: Drink plenty of fluids  Ice to area for comfort           Discharge Disposition:   Discharged to home      Sayra Pozo MD       ADDENDUM:  Patient discharged on 8/6/22.  She stayed overnight due to poor feeding of baby.  Dates altered above to reflect new date of discharge.

## 2022-08-05 NOTE — PROGRESS NOTES
"[facility] Postpartum Note    S:  Mariya Santiago feels well this morning. Was able to sleep last night. Pain control adequate. Lochia minimal. Voiding. Breast feeding. Mood Good.     O:  Vitals were reviewed  Blood pressure 106/74, pulse 71, temperature 97.6  F (36.4  C), temperature source Oral, resp. rate 16, height 1.772 m (5' 9.75\"), weight 93 kg (205 lb), SpO2 97 %, unknown if currently breastfeeding.      General: healthy, alert and no distress  Abd: soft, appropriately tender, fundus firm  Legs: Non-tender, 1+ pitting edema    No results found for: RH  Rubella: immune    Assessment and Plan:   Postpartum Day #1, status post vaginal delivery, doing well.  -- Routine care  -- Contraception: considering    Lactation concerns  -cracking, using shields, cream, pads    Mild ABLA  -from 13.1 to 11.1  -asymptomatic, OTC iron/PNVs    Sayra Pozo MD    "

## 2022-08-05 NOTE — LACTATION NOTE
This note was copied from a baby's chart.  Lactation visit. Shyanne is Mariya's first infant, she reports nursing has improved today with the help of her bedside RN Mary. Her L nipple was cracked/bleeding, her R had a blister. With 1730 feeding R nipple appears slightly bruised but no blister, some redness on L nipple noted but intact. Mariya states she is feeling much more comfortable with feedings, is using nipple shield. Writer assisted with latch on R breast - attempted football and cradle hold and Shyanne latched but unable to stimulate to suck. Switched to L breast in football hold - Shyanne latched and interested, rhythmic suck noted and swallows heard. Discussed bringing Shyanne in chin first to help keep lower lip flanged. Mariya has the Medela MaxFlow pump at bedside, reviewed set up, how to use pump and when to initiate pumping postpartum. She is aware she may call for lactation support prn. Bedside RN updated on visit.

## 2022-08-05 NOTE — PLAN OF CARE
Pt meeting expected outcomes. Vitals stable. Fundus firm and midline. Denies difficulty voiding. Pain controlled with ibuprofen. Heating pad given for back pain. Independent with self cares. Breastfeeding  with a shield due to flat nipples, nipples are cracked and sore from previous breastfeeding attempts, encouraged mothers love cream and hydrogel pads in between feeds. Assisted with latching infant for 0830 feed that went well and patient has since been able to latch infant independently. Anticipated discharge home tomorrow.

## 2022-08-05 NOTE — ANESTHESIA POSTPROCEDURE EVALUATION
Patient: Mariya Palomino Jack    Procedure: * No procedures listed *       Anesthesia Type:  Epidural    Note:     Postop Pain Control:    PONV:    Neuro/Psych:    Airway/Respiratory:    CV/Hemodynamics:    Other NRE:    DID A NON-ROUTINE EVENT OCCUR?     Event details/Postop Comments:      S/P epidural for labor.   I or my partner was immediately available for management of this patient during epidural analgesia infusion.  VSS.  Doing well. Block resolved.  Neuro at baseline. Denies positional headache. Minimal side effects easily managed w/ PRN meds. No apparent anesthetic complications. No follow-up required.    Thuan Pang MD           Last vitals:  Vitals:    08/04/22 2100 08/05/22 0100 08/05/22 0603   BP: 104/66 106/74    Pulse:      Resp: 16 16    Temp: 98.7  F (37.1  C) 98  F (36.7  C) 97.6  F (36.4  C)   SpO2:          Electronically Signed By: Thuan Pang MD  August 5, 2022  7:55 AM

## 2022-08-05 NOTE — PLAN OF CARE
Patient VSS, good pain control with medications as per MAR.  Patient is also using tucks pads and britton bottle for comfort measures.  Ambulating and self cares independently. Fundal checks WDL. Abdominal binder given for comfort.   Breastfeeding, using a shield, flat sore nipples, right is cracked and painful, hydrogel pads given along with mother's love cream.  Will ask lactation nurse on the next shift to work with the patient and to properly size her flanges for her home breast pump.  FOB at the bedside and attentive to Mom and active in baby cares. Mother and father bonding with the baby.

## 2022-08-06 VITALS
WEIGHT: 205 LBS | HEIGHT: 70 IN | OXYGEN SATURATION: 97 % | RESPIRATION RATE: 14 BRPM | DIASTOLIC BLOOD PRESSURE: 59 MMHG | SYSTOLIC BLOOD PRESSURE: 106 MMHG | BODY MASS INDEX: 29.35 KG/M2 | HEART RATE: 59 BPM | TEMPERATURE: 98 F

## 2022-08-06 PROCEDURE — 250N000013 HC RX MED GY IP 250 OP 250 PS 637: Performed by: OBSTETRICS & GYNECOLOGY

## 2022-08-06 RX ORDER — FERROUS SULFATE 325(65) MG
325 TABLET ORAL DAILY
Status: DISCONTINUED | OUTPATIENT
Start: 2022-08-06 | End: 2022-08-06 | Stop reason: HOSPADM

## 2022-08-06 RX ORDER — FERROUS SULFATE 325(65) MG
325 TABLET ORAL DAILY
Qty: 60 TABLET | Refills: 0 | Status: SHIPPED | OUTPATIENT
Start: 2022-08-06

## 2022-08-06 RX ADMIN — PRENATAL VITAMINS-IRON FUMARATE 27 MG IRON-FOLIC ACID 0.8 MG TABLET 1 TABLET: at 08:16

## 2022-08-06 RX ADMIN — DOCUSATE SODIUM 100 MG: 100 CAPSULE, LIQUID FILLED ORAL at 08:16

## 2022-08-06 RX ADMIN — IBUPROFEN 800 MG: 800 TABLET, FILM COATED ORAL at 10:23

## 2022-08-06 ASSESSMENT — ACTIVITIES OF DAILY LIVING (ADL)
ADLS_ACUITY_SCORE: 20

## 2022-08-06 NOTE — PLAN OF CARE
VSS and pt meeting expected goals for the shift. Using mother's love cream and gel pads for sore nipples. Pumping initiated this shift. Medicated with ibuprofen x1. Family bonding well. Monitor.

## 2022-08-06 NOTE — PLAN OF CARE
VSS. Up independently and voiding without difficulty. Patient is breastfeeding infant every 2-3 hours. Patient is pumping post feed and getting a few drops of colostrum with each session. Using nipple cream and hydrogel pads for nipple redness and discomfort. Patient denied having any pain during shift. Fundus firm and midline. Lochia scant. Patient able to perform all of own cares and infant cares. FOB at bedside and supportive. Parents bonding well with infant.

## 2022-08-06 NOTE — PLAN OF CARE
Patient meeting all expected outcomes. Discharge instructions given, all questions answered. Follow up expectations reviewed. Medications given and instructed on use. Pt has own breast pump. Patient discharged home with  and significant other.

## 2022-08-06 NOTE — PROGRESS NOTES
Patient Name:  Mariya Santiago   MRN:  [id number]  Age:  31 year old    YOB: 1990      POSTPARTUM PROGRESS NOTE    Pt is PPD#2 s/p vaginal delivery.  She is doing well without complaints.  Pt is ambulating, voiding, tolerating a regular diet.  Pain is well controlled and lochia is within normal limits.  She is pumping, breastfeeding, and formula feeding.  Baby is doing well.    Objective:    Temp:  [98  F (36.7  C)-98.3  F (36.8  C)] 98  F (36.7  C)  Pulse:  [58-63] 59  Resp:  [14-16] 14  BP: (106-122)/(59-67) 106/59  205 lbs 0 oz    General Appearance:  NAD  Lungs:  unlabored  Cardiovascular:  RRR  Abdomen:  nontender, nondistended  Fundus:  firm, below the umbilicus      Lower extremities:  trace symmetric edema    Lab Review:    ABO/RH(D)   Date Value Ref Range Status   2022 A POS  Final     Hemoglobin   Date Value Ref Range Status   2022 11.1 (L) 11.7 - 15.7 g/dL Final   2022 13.1 11.7 - 15.7 g/dL Final     Hematocrit   Date Value Ref Range Status   2022 38.9 35.0 - 47.0 % Final       Lab Results   Component Value Date    WBC 16.2 2022     Lab Results   Component Value Date    RBC 4.31 2022     Lab Results   Component Value Date    HGB 11.1 2022     Lab Results   Component Value Date    HCT 38.9 2022     No components found for: MCT  Lab Results   Component Value Date    MCV 90 2022     Lab Results   Component Value Date    MCH 30.4 2022     Lab Results   Component Value Date    MCHC 33.7 2022     Lab Results   Component Value Date    RDW 12.3 2022     Lab Results   Component Value Date     2022       Assessment: 30yo  PPD#2 s/p vaginal delivery, doing well.    Plan:   - Postpartum: recovering well. Pain well controlled. Cont PO pain meds and regular diet. Encourage ambulation.  - ABLA: PO iron  - Contraception: Mirena IUD  - Dispo: anticipate DC today    Meredith Chan, MD [facility]  OB/GYN  August 6, 2022

## 2022-08-06 NOTE — PLAN OF CARE
Pt meeting expected outcomes. Vitals stable. Fundus firm and midline. Denies difficulty voiding. Pain controlled with oral medications and heating pad. Independent with self cares. Breastfeeding, pumping and supplementing with formula due to infant with higher weight loss. Plan for discharge home this afternoon if baby's weight is stable.

## 2023-02-12 ENCOUNTER — HEALTH MAINTENANCE LETTER (OUTPATIENT)
Age: 33
End: 2023-02-12

## 2024-03-10 ENCOUNTER — HEALTH MAINTENANCE LETTER (OUTPATIENT)
Age: 34
End: 2024-03-10

## 2024-05-22 NOTE — PROVIDER NOTIFICATION
08/04/22 0815   Provider Notification   Provider Name/Title Dr Tang   Method of Notification At Bedside   Comments   Comments Epidural procedure explained including risks. Pt agrees to proceed. Informed consent is signed, Time out completed.      The patient's goals for the shift include      The clinical goals for the shift include safety

## 2024-07-01 ENCOUNTER — HOSPITAL ENCOUNTER (OUTPATIENT)
Facility: CLINIC | Age: 34
End: 2024-07-01
Admitting: OBSTETRICS & GYNECOLOGY

## 2024-07-01 ENCOUNTER — HOSPITAL ENCOUNTER (OUTPATIENT)
Facility: CLINIC | Age: 34
Discharge: HOME | End: 2024-07-01
Attending: OBSTETRICS & GYNECOLOGY | Admitting: OBSTETRICS & GYNECOLOGY

## 2024-07-01 VITALS
TEMPERATURE: 98.6 F | DIASTOLIC BLOOD PRESSURE: 66 MMHG | SYSTOLIC BLOOD PRESSURE: 106 MMHG | HEIGHT: 70 IN | HEART RATE: 67 BPM | BODY MASS INDEX: 29.35 KG/M2 | RESPIRATION RATE: 16 BRPM | WEIGHT: 205 LBS

## 2024-07-01 PROBLEM — Z36.89 ENCOUNTER FOR TRIAGE IN PREGNANT PATIENT: Status: ACTIVE | Noted: 2022-08-04

## 2024-07-01 PROCEDURE — G0463 HOSPITAL OUTPT CLINIC VISIT: HCPCS

## 2024-07-01 RX ORDER — FAMOTIDINE 40 MG/1
40 TABLET, FILM COATED ORAL DAILY
COMMUNITY

## 2024-07-01 ASSESSMENT — ACTIVITIES OF DAILY LIVING (ADL)
ADLS_ACUITY_SCORE: 20

## 2024-07-01 NOTE — PROVIDER NOTIFICATION
07/01/24 1021   Provider Notification   Provider Name/Title Dr. Aguilera   Method of Notification Electronic Page   Request Evaluate - Remote   Notification Reason Patient Arrived;Uterine Activity;Pain;Status Update     Dr. Ale matthews paged and updated on patient arrival, patient report of slipping on floor and falling with direct hit to abdomen. Patient denies pain, cramping, contractions, bleeding, and leaking of fluid. FHR tracing with baseline 135bpm, moderate variability, no contractions per  toco, patient abdomen soft and non-tender. Pregnancy complicated by velamentous cord insertion and low lying placenta. Blood type A positive. Orders for 4 hours of monitoring, patient may eat and drink if no contractions per toco, palpation, or patient report after one hour.

## 2024-07-01 NOTE — DISCHARGE INSTRUCTIONS
Learning About When to Call Your Doctor During Pregnancy (After 20 Weeks)  Overview  It's common to have concerns about what might be a problem when you're pregnant. Most pregnancies don't have any serious problems. But it's still important to know when to call your doctor if you have certain symptoms or signs of labor.  These are general suggestions. Your doctor may give you some more information about when to call.  When to call your doctor (after 20 weeks)  Call 911  anytime you think you may need emergency care. For example, call if:  You have severe vaginal bleeding. This means you are soaking through a pad each hour for 2 or more hours.  You have sudden, severe pain in your belly.  You have chest pain, are short of breath, or cough up blood.  You passed out (lost consciousness).  You have a seizure.  You see or feel the umbilical cord.  You think you are about to deliver your baby and can't make it safely to the hospital or birthing center.  Call your doctor now or seek immediate medical care if:  You have vaginal bleeding.  You have belly pain.  You have a fever.  You are dizzy or lightheaded, or you feel like you may faint.  You have signs of a blood clot in your leg (called a deep vein thrombosis), such as:  Pain in the calf, back of the knee, thigh, or groin.  Swelling in your leg or groin.  A color change on the leg or groin. The skin may be reddish or purplish, depending on your usual skin color.  You have symptoms of preeclampsia, such as:  Sudden swelling of your face, hands, or feet.  New vision problems (such as dimness, blurring, or seeing spots).  A severe headache.  You have a sudden release of fluid from your vagina. (You think your water broke.)  You've been having regular contractions for an hour. This means that you've had at least 6 contractions within 1 hour, even after you change your position and drink fluids.  You notice that your baby has stopped moving or is moving less than  "normal.  You have signs of heart failure, such as:  New or increased shortness of breath.  New or worse swelling in your legs, ankles, or feet.  Sudden weight gain, such as more than 2 to 3 pounds in a day or 5 pounds in a week.  Feeling so tired or weak that you cannot do your usual activities.  You have symptoms of a urinary tract infection. These may include:  Pain or burning when you urinate.  A frequent need to urinate without being able to pass much urine.  Pain in the flank, which is just below the rib cage and above the waist on either side of the back.  Blood in your urine.  Watch closely for changes in your health, and be sure to contact your doctor if:  You have vaginal discharge that smells bad.  You feel sad, anxious, or hopeless for more than a few days.  You have skin changes, such as a rash, itching, or a yellow color to your skin.  You have other concerns about your pregnancy.  If you have labor signs at 37 weeks or more  If you have signs of labor at 37 weeks or more, your doctor may tell you to call when your labor becomes more active. Symptoms of active labor include:  Contractions that are regular.  Contractions that are less than 5 minutes apart.  Contractions that are hard to talk through.  Follow-up care is a key part of your treatment and safety. Be sure to make and go to all appointments, and call your doctor if you are having problems. It's also a good idea to know your test results and keep a list of the medicines you take.  Where can you learn more?  Go to https://www.[email or web address].net/patiented  Enter N531 in the search box to learn more about \"Learning About When to Call Your Doctor During Pregnancy (After 20 Weeks).\"  Current as of: July 10, 2023               Content Version: 14.0    4015-4016 [employer].   Care instructions adapted under license by your healthcare professional. If you have questions about a medical condition or this instruction, always ask your healthcare " professional. [employer] disclaims any warranty or liability for your use of this information.

## 2024-07-01 NOTE — PROVIDER NOTIFICATION
07/01/24 1315   Provider Notification   Provider Name/Title Dr. Aguilera   Method of Notification Electronic Page   Request Evaluate - Remote   Notification Reason Status Update     Dr. Ale matthews paged and updated on FHR tracing with moderate variability, baseline 135bpm, accelerations present, no contractions per toco, palpation, or patient report. Patient reports very mild abdominal discomfort, but denies need for pain medication. Orders received to discharge patient to home with follow up as scheduled in clinic.

## 2024-07-01 NOTE — PLAN OF CARE
"Data: Patient presented to [facility]: 2024  9:41 AM.  Reason for maternal/fetal assessment is fall/trauma. Patient reports around 0900 she was walking down the hallway at work this morning where the floor felt slightly wet and she slipped and fell. Patient states she was carrying her computer, but fell \"starfish\" and directly on her abdomen. Patient is a .  Prenatal record reviewed. Pregnancy  has been complicated by velamentous cord insertion and low lying placenta.  Gestational Age 25w3d. VSS. Fetal movement decreased since the fall, but patient reports not paying close attention. Patient denies uterine contractions, leaking of vaginal fluid/rupture of membranes, vaginal bleeding, abdominal pain, pelvic pressure, nausea, vomiting, headache, visual disturbances, epigastric or URQ pain, significant edema. Support person is present.   Action: Verbal consent for EFM. Triage assessment completed. Bill of rights reviewed.  Response: Patient verbalized agreement with plan. Will contact Dr Heather Aguilera with update and for further orders.       "

## 2024-12-16 NOTE — PLAN OF CARE
Data: Patient assessed in the [facility] for fall/trauma.  Cervical exam not examined.  Membranes intact.  Contractions/uterine assessment none per toco, palpation, or patient report.  Action:  Presumed adequate fetal oxygenation documented (see flow record). Discharge instructions reviewed.  Patient instructed to report change in fetal movement, vaginal leaking of fluid or bleeding, abdominal pain, or any concerns related to the pregnancy to her nurse/physician.    Response: Orders to discharge home per Heather Aguilera.  Patient verbalized understanding of education and verbalized agreement with plan. Discharged to home at 1406.       What Is The Reason For Today's Visit?: Full Body Skin Examination What Is The Reason For Today's Visit? (Being Monitored For X): concerning skin lesions on a periodic basis

## 2025-03-16 ENCOUNTER — HEALTH MAINTENANCE LETTER (OUTPATIENT)
Age: 35
End: 2025-03-16